# Patient Record
Sex: MALE | Race: WHITE
[De-identification: names, ages, dates, MRNs, and addresses within clinical notes are randomized per-mention and may not be internally consistent; named-entity substitution may affect disease eponyms.]

---

## 2019-02-06 ENCOUNTER — HOSPITAL ENCOUNTER (OUTPATIENT)
Dept: HOSPITAL 92 - SDC | Age: 24
Discharge: HOME | End: 2019-02-06
Attending: OTOLARYNGOLOGY
Payer: COMMERCIAL

## 2019-02-06 VITALS — BODY MASS INDEX: 33.9 KG/M2

## 2019-02-06 DIAGNOSIS — J35.8: ICD-10-CM

## 2019-02-06 DIAGNOSIS — J45.909: ICD-10-CM

## 2019-02-06 DIAGNOSIS — G47.33: ICD-10-CM

## 2019-02-06 DIAGNOSIS — J35.03: Primary | ICD-10-CM

## 2019-02-06 PROCEDURE — 0CTQXZZ RESECTION OF ADENOIDS, EXTERNAL APPROACH: ICD-10-PCS | Performed by: OTOLARYNGOLOGY

## 2019-02-06 PROCEDURE — 0CTPXZZ RESECTION OF TONSILS, EXTERNAL APPROACH: ICD-10-PCS | Performed by: OTOLARYNGOLOGY

## 2019-02-06 PROCEDURE — 88304 TISSUE EXAM BY PATHOLOGIST: CPT

## 2019-02-07 NOTE — OP
DATE OF PROCEDURE:  02/06/2019



PREOPERATIVE DIAGNOSES:  

1. Chronic adenotonsillitis.

2. Adenotonsillar hypertrophy.



POSTOPERATIVE DIAGNOSES:  

1. Chronic adenotonsillitis.

2. Adenotonsillar hypertrophy.



PROCEDURES PERFORMED:  Tonsillectomy, adenoidectomy.



ESTIMATED BLOOD LOSS:  0 mL.



COMPLICATIONS:  None.



ANESTHESIA:  GETA.



DESCRIPTION OF PROCEDURE:  After consent was obtained, the patient was identified,

brought to the operating room, and placed on the operating table in the supine

position.  General endotracheal anesthesia and intravenous access were obtained and

we proceeded with positioning the patient for oropharyngeal surgery. Oropharyngeal

exposure was obtained with a Janes-Dheeraj mouth gag after a head drape was placed and

secured with a towel clip. The Janes-Dheeraj mouth gag was then suspended from the

Weller tray and palatal elevation was achieved with a red rubber catheter.  The right

tonsil was addressed first.  We used a curved Allis to grasp the tonsil and retract

it medially as an anterior pillar incision was made.  The retrotonsillar fascial

plane was then established and blunt dissection was performed with the suction

cautery.  Blood vessels were anticipated, identified, and cauterized as they were

encountered.  Ultimately, dissection was carried to the posterior tonsillar pillar

mucosa which was incised hemostatically, as well as the base of tongue connection.

The tonsil was then passed off as a specimen and bleeding points within the

tonsillar bed were cauterized under direct visualization.  We subsequently turned

our attention to the contralateral side, where using a similar technique, a near

identical procedure was performed.  Again, the tonsil was grasped and retracted

medially with a curved Allis.  The retrotonsillar fascial plane was established and

while the anterior pillar was retracted medially.  The hemostatic blunt dissection

of the tonsil with a suction cautery was performed with blood vessels anticipated,

identified, and cauterized as they were encountered.  Again, dissection continued to

the base of tongue and posterior tonsillar pillar mucosa which was incised in a

hemostatic fashion.  The tonsillar beds were then carefully inspected and bleeding

points were identified and cauterized with a suction cautery.  After this portion of

the procedure, hemostasis was completely obtained.  Under direct mirror

visualization, we visualized the adenoid pad.  Under direct mirror visualization, we

removed the bulk of the adenoid tissue with the adenoid curette.  We then packed the

nasopharynx for an appropriate period of time with Maximus-Synephrine-saturated

tonsillar sponges.  After a period of observation, we removed the pack.  Under

indirect mirror visualization, we obtained hemostasis and vaporization of residual

adenoid tissue with electrocautery.  The patient's oral cavity was copiously

irrigated with iced saline and subsequently suctioned.  After completion of the

procedure, the nasal cavity and oropharynx were irrigated and suctioned as were the

gastric contents.  The patient was then awakened and transferred to the recovery

room where the patient remained in stable condition prior to discharge to Day Stay. 







Job ID:  [id number]